# Patient Record
Sex: FEMALE | Race: OTHER | NOT HISPANIC OR LATINO | Employment: UNEMPLOYED | URBAN - METROPOLITAN AREA
[De-identification: names, ages, dates, MRNs, and addresses within clinical notes are randomized per-mention and may not be internally consistent; named-entity substitution may affect disease eponyms.]

---

## 2020-08-31 ENCOUNTER — OFFICE VISIT (OUTPATIENT)
Dept: FAMILY MEDICINE CLINIC | Facility: CLINIC | Age: 30
End: 2020-08-31

## 2020-08-31 ENCOUNTER — LAB (OUTPATIENT)
Dept: LAB | Facility: HOSPITAL | Age: 30
End: 2020-08-31

## 2020-08-31 VITALS
HEIGHT: 63 IN | HEART RATE: 61 BPM | WEIGHT: 82 LBS | OXYGEN SATURATION: 99 % | SYSTOLIC BLOOD PRESSURE: 90 MMHG | DIASTOLIC BLOOD PRESSURE: 48 MMHG | BODY MASS INDEX: 14.53 KG/M2

## 2020-08-31 DIAGNOSIS — R19.7 DIARRHEA, UNSPECIFIED TYPE: ICD-10-CM

## 2020-08-31 DIAGNOSIS — E44.0 MODERATE PROTEIN-CALORIE MALNUTRITION (HCC): ICD-10-CM

## 2020-08-31 DIAGNOSIS — F41.9 ANXIETY: ICD-10-CM

## 2020-08-31 DIAGNOSIS — R63.4 WEIGHT LOSS: ICD-10-CM

## 2020-08-31 LAB
BASOPHILS # BLD AUTO: 0.04 10*3/MM3 (ref 0–0.2)
BASOPHILS NFR BLD AUTO: 0.6 % (ref 0–1.5)
DEPRECATED RDW RBC AUTO: 42 FL (ref 37–54)
EOSINOPHIL # BLD AUTO: 0.17 10*3/MM3 (ref 0–0.4)
EOSINOPHIL NFR BLD AUTO: 2.5 % (ref 0.3–6.2)
ERYTHROCYTE [DISTWIDTH] IN BLOOD BY AUTOMATED COUNT: 12.1 % (ref 12.3–15.4)
HCT VFR BLD AUTO: 42.6 % (ref 34–46.6)
HGB BLD-MCNC: 13.8 G/DL (ref 12–15.9)
IMM GRANULOCYTES # BLD AUTO: 0.01 10*3/MM3 (ref 0–0.05)
IMM GRANULOCYTES NFR BLD AUTO: 0.1 % (ref 0–0.5)
LYMPHOCYTES # BLD AUTO: 2.1 10*3/MM3 (ref 0.7–3.1)
LYMPHOCYTES NFR BLD AUTO: 31.3 % (ref 19.6–45.3)
MCH RBC QN AUTO: 30.3 PG (ref 26.6–33)
MCHC RBC AUTO-ENTMCNC: 32.4 G/DL (ref 31.5–35.7)
MCV RBC AUTO: 93.6 FL (ref 79–97)
MONOCYTES # BLD AUTO: 0.75 10*3/MM3 (ref 0.1–0.9)
MONOCYTES NFR BLD AUTO: 11.2 % (ref 5–12)
NEUTROPHILS NFR BLD AUTO: 3.63 10*3/MM3 (ref 1.7–7)
NEUTROPHILS NFR BLD AUTO: 54.3 % (ref 42.7–76)
NRBC BLD AUTO-RTO: 0 /100 WBC (ref 0–0.2)
PLATELET # BLD AUTO: 231 10*3/MM3 (ref 140–450)
PMV BLD AUTO: 11.8 FL (ref 6–12)
RBC # BLD AUTO: 4.55 10*6/MM3 (ref 3.77–5.28)
WBC # BLD AUTO: 6.7 10*3/MM3 (ref 3.4–10.8)

## 2020-08-31 PROCEDURE — 85025 COMPLETE CBC W/AUTO DIFF WBC: CPT

## 2020-08-31 PROCEDURE — 84443 ASSAY THYROID STIM HORMONE: CPT

## 2020-08-31 PROCEDURE — 80053 COMPREHEN METABOLIC PANEL: CPT

## 2020-08-31 PROCEDURE — 99204 OFFICE O/P NEW MOD 45 MIN: CPT | Performed by: INTERNAL MEDICINE

## 2020-08-31 RX ORDER — ESCITALOPRAM OXALATE 10 MG/1
10 TABLET ORAL DAILY
Qty: 30 TABLET | Refills: 5 | Status: SHIPPED | OUTPATIENT
Start: 2020-08-31 | End: 2022-09-17

## 2020-08-31 NOTE — PROGRESS NOTES
Kimberlee Max  1990  [id number]  Patient Care Team:  Provider, No Known as PCP - General    Kimberlee Max is a 30 y.o. female here today to establish care.  This patient is accompanied by their  who contributes to the history of their care.    Chief Complaint:    Chief Complaint   Patient presents with   • Establish Care   • Anxiety         History of Present Illness:   This is a 30-year-old Mexican female who is in attendance with her .  He translates and adds history.  She has been continuously losing weight and having postprandial diarrhea and becoming withdrawn since the birth of their second son.  Child is nearly 2.  Soon after giving birth patient took her son back home to [facility].  She was there approximately 6 months.  Since being back her symptoms began.  She is described as withdrawn not making eye contact.  She has difficulty at times with obsession with over washing the baby after it bodies.  Additionally she has become obsessed with her personal hygiene after she soils her self.  She can have 2 loose bowel movements after eating.  Her baseline weight is unknown however indicates that her appetite is poor.  There have been no fevers or chills.  There is no abdominal pain.  There is no blood in her stool.  She apparently has been seen at the [facility] emergency room in [facility] several times (?  Obstetrics) and was told to find a primary care.  There is no nausea or vomiting.  They have a cousin who stays with him.  At times the  will be called in numerous times by his wife and he will have to leave work to come attend to her and the baby.  He is becoming increasingly stressed with having to leave work to care for both.    She was not depressed or anxious after her first birth.  There is marked anhedonia.  There is decrease in appetite.  She does not cry.  She does admit to frequent anxiety.      No past medical history on file.    No past surgical history on  "file.     No family history on file.    Social History     Socioeconomic History   • Marital status: Single     Spouse name: Not on file   • Number of children: Not on file   • Years of education: Not on file   • Highest education level: Not on file   Tobacco Use   • Smoking status: Never Smoker   • Smokeless tobacco: Never Used   Substance and Sexual Activity   • Alcohol use: Never     Frequency: Never   • Drug use: No       No Known Allergies    Review of Systems:    Review of Systems   Constitutional: Positive for appetite change, fatigue and unexpected weight loss.   Eyes: Negative for blurred vision and double vision.   Cardiovascular: Negative for chest pain, palpitations and leg swelling.   Gastrointestinal: Positive for diarrhea.   Endocrine: Negative for cold intolerance, heat intolerance, polydipsia and polyphagia.   Genitourinary: Negative for breast pain, dysuria and hematuria.   Musculoskeletal: Negative for arthralgias, back pain and myalgias.   Skin: Negative for dry skin and rash.   Allergic/Immunologic: Negative for environmental allergies.   Neurological: Negative for light-headedness, headache and confusion.   Psychiatric/Behavioral: Positive for decreased concentration, dysphoric mood and depressed mood. Negative for hallucinations, self-injury and suicidal ideas. The patient is nervous/anxious.        Vitals:    08/31/20 1446   BP: 90/48   Pulse: 61   SpO2: 99%   Weight: 37.2 kg (82 lb)   Height: 160 cm (63\")     Body mass index is 14.53 kg/m².      Current Outpatient Medications:   •  escitalopram (Lexapro) 10 MG tablet, Take 1 tablet by mouth Daily., Disp: 30 tablet, Rfl: 5    Physical Exam:    Physical Exam   Constitutional: She is oriented to person, place, and time. She appears well-developed and well-nourished. No distress.   Thin underweight, withdrawn   HENT:   Head: Normocephalic and atraumatic.   Right Ear: External ear normal.   Left Ear: External ear normal.   Mouth/Throat: " Oropharynx is clear and moist. No oropharyngeal exudate.   Eyes: Conjunctivae and EOM are normal. Right eye exhibits no discharge. Left eye exhibits no discharge. No scleral icterus.   Neck: Normal range of motion. Neck supple. No JVD present. No tracheal deviation present. No thyromegaly present.   Cardiovascular: Normal rate, regular rhythm, normal heart sounds and intact distal pulses.   PMI nondisplaced   Pulmonary/Chest: Effort normal and breath sounds normal. She has no wheezes. She has no rales.   No dullness to percussion     Abdominal: Soft. Bowel sounds are normal. There is no tenderness. There is no rebound and no guarding.   Musculoskeletal: Normal range of motion. She exhibits deformity. She exhibits no edema.        Lymphadenopathy:     She has no cervical adenopathy.   Neurological: She is alert and oriented to person, place, and time. No cranial nerve deficit. She exhibits normal muscle tone. Coordination normal.   Skin: Skin is warm and dry. Capillary refill takes less than 2 seconds. No rash noted. She is not diaphoretic. No pallor.   Psychiatric: Her speech is delayed. She is slowed and withdrawn. Thought content is not delusional. She exhibits a depressed mood. She expresses no suicidal plans and no homicidal plans.   Her affect is blunted.  She will not make eye contact.  She sits staring at the floor.  Marked psychomotor retardation is noted She is inattentive.   Nursing note and vitals reviewed.      Procedures    Results Review:    None    Assessment/Plan:      Problem List Items Addressed This Visit     None      Visit Diagnoses     Postpartum depression    -  Primary    Relevant Medications    escitalopram (Lexapro) 10 MG tablet    Other Relevant Orders    CBC & Differential    Comprehensive Metabolic Panel    TSH Rfx On Abnormal To Free T4    Ambulatory Referral to Gastroenterology    Anxiety        Relevant Orders    CBC & Differential    Comprehensive Metabolic Panel    TSH Rfx On  Abnormal To Free T4    Ambulatory Referral to Gastroenterology    Diarrhea, unspecified type        Weight loss          I am unsure what is going on however most concerned about postpartum depression versus major depression.  Interesting to note that her symptoms began after returning from her home [place].  I do believe she needs acute psychiatric intervention and have referred him to the [facility] for assessment.  I also recommend Lexapro 10 mg daily.  I would like to get her medical records however requested a complete metabolic panel CBC and TSH to initiate evaluation.  Finally given her diarrhea and progressive weight loss gastroenterologic referral for endoscopic evaluation is warranted.    Plan of care reviewed with patient at the conclusion of today's visit. Education was provided regarding diagnosis and management.  Patient verbalizes understanding of and agreement with management plan.    Return in about 4 weeks (around 9/28/2020).    José Miguel Ojeda MD    Please note that portions of this note may have been completed with a voice recognition program. Efforts were made to edit the dictations, but occasionally words are mistranscribed.

## 2020-09-01 LAB
ALBUMIN SERPL-MCNC: 4.6 G/DL (ref 3.5–5.2)
ALBUMIN/GLOB SERPL: 1.6 G/DL
ALP SERPL-CCNC: 55 U/L (ref 39–117)
ALT SERPL W P-5'-P-CCNC: 12 U/L (ref 1–33)
ANION GAP SERPL CALCULATED.3IONS-SCNC: 8.2 MMOL/L (ref 5–15)
AST SERPL-CCNC: 20 U/L (ref 1–32)
BILIRUB SERPL-MCNC: 0.5 MG/DL (ref 0–1.2)
BUN SERPL-MCNC: 15 MG/DL (ref 6–20)
BUN/CREAT SERPL: 15.6 (ref 7–25)
CALCIUM SPEC-SCNC: 9.9 MG/DL (ref 8.6–10.5)
CHLORIDE SERPL-SCNC: 104 MMOL/L (ref 98–107)
CO2 SERPL-SCNC: 28.8 MMOL/L (ref 22–29)
CREAT SERPL-MCNC: 0.96 MG/DL (ref 0.57–1)
GFR SERPL CREATININE-BSD FRML MDRD: 69 ML/MIN/1.73
GFR SERPL CREATININE-BSD FRML MDRD: 84 ML/MIN/1.73
GLOBULIN UR ELPH-MCNC: 2.9 GM/DL
GLUCOSE SERPL-MCNC: 59 MG/DL (ref 65–99)
POTASSIUM SERPL-SCNC: 4 MMOL/L (ref 3.5–5.2)
PROT SERPL-MCNC: 7.5 G/DL (ref 6–8.5)
SODIUM SERPL-SCNC: 141 MMOL/L (ref 136–145)
TSH SERPL DL<=0.05 MIU/L-ACNC: 1.4 UIU/ML (ref 0.27–4.2)

## 2020-09-22 ENCOUNTER — TELEPHONE (OUTPATIENT)
Dept: FAMILY MEDICINE CLINIC | Facility: CLINIC | Age: 30
End: 2020-09-22

## 2020-09-22 ENCOUNTER — HOSPITAL ENCOUNTER (OUTPATIENT)
Dept: NUTRITION | Facility: HOSPITAL | Age: 30
Setting detail: RECURRING SERIES
Discharge: HOME | End: 2020-09-22

## 2020-09-22 PROCEDURE — 97802 MEDICAL NUTRITION INDIV IN: CPT

## 2020-09-22 NOTE — TELEPHONE ENCOUNTER
PER LEN ROY  DIETITIAN ,WOULD LIKE TO  ASK A FEW QUESTIONS REGARDING PT. DURING VISIT TODAY PT SHARED SHE HAS NOT BEEN EATING AT ALL AND AFTER SHE EATS ANY FOOD SHE IMMEDIATELY GOES TO RESTROOM AND HAS BEEN HAVING DIARRHEA FOR DAYS.  LEN DID RECOMMEND A CLEAR LIQUID DIET, OR BRAT DIET BUT WOULD LIKE TO SPEAK WITH DR. PHAM ABOUT THE PT.      [phone]

## 2020-09-23 VITALS — HEIGHT: 63 IN | WEIGHT: 82.01 LBS | BODY MASS INDEX: 14.53 KG/M2

## 2020-09-23 RX ORDER — MEGESTROL ACETATE 125 MG/ML
625 SUSPENSION ORAL DAILY
Qty: 400 ML | Refills: 2 | Status: SHIPPED | OUTPATIENT
Start: 2020-09-23 | End: 2022-09-17

## 2020-09-23 NOTE — CONSULTS
"Adult Outpatient Nutrition  Assessment/PES    Patient Name:  Kimberlee Max  YOB: 1990  MRN: [id number]    Assessment Date:  09/22/2020    Comments:      This medical referred consult was provided via telehealth as patient was unable to attend an in-office appointment today due to the COVID-19 crisis. Consent for treatment was given verbally. Ernestina  services used today (ID#[id number]). Patient and her spouse present for the visit. RD spent a total of 30 minutes with patient today.      Patient seen today for unintended weight loss. Patient reported she has unintentionally lost ~15 lbs in the past 6 months. Patient shared that after any food she intakes, she \"immediately has a loose bowel movement.\" Patient reported she has recently only been eating one or two servings of plain white rice daily and still experiencing these loose bowel movements. The patient and her spouse stated they are looking for a solution to stop the weight loss and the loose bowel movements.    RD emphasized concern for the risk of dehydration and the seriousness of patient's unintended weight loss. RD shared with the patient and her spouse that RD would like to call the provider and collaborate on recommendations for the patient. RD encouraged patient to drink plenty of water and try a clear liquid diet until RD and MD can collaborate. RD mailed patient several pictures of clear liquid foods to try, including Ensure Clear and broth. RD also mailed patient materials for the BRAT diet (bananas, rice, applesauce, and toast) if patient is able to advance her diet in a couple days. RD explained clear liquid diet and brat diet to the patient. AUGUSTINA shared that she would call the patient after collaborating with referring provider.     09/23/20 -  RD called referring provider, Dr. José Miguel Ojeda, and informed him of patient's situation as well as asked to collaborate. MD stated that he is fine with the patient being placed on a clear " "liquid diet and advancing to a BRAT diet when able. RD noticed patient is scheduled to see GI on 10/09/2020. RD will wait to schedule follow up visit with patient until after GI consult. RD will follow GI recommendations.        Goals:  1) Follow clear liquid diet for 2 days and record bowel movements.   2) Advance to BRAT diet if able to tolerate clear liquid diet after 2 days.       Total of 30 minutes spent with patient on nutrition counseling. Education based on Academy of Dietetics and Nutrition guidelines. Patient was provided with RD's contact information. RD will call patient after collaborating with referring provider. Thank you for this referral.    General Info     Row Name 09/23/20 0842       Today's Session    Person(s) attending today's session  Patient;Spouse     Services Used Today?  Yes       General Information    How Well Do You Speak English?  not at all well    Preferred Language  Ernestina    Are you able to read and write English?  No    Lives With  spouse;child(hunter), dependent          Anthropometrics     Row Name 09/23/20 0846 09/23/20 0843       Anthropometrics    Height  160 cm (62.99\")  160 cm (62.99\")    Weight  --  37.2 kg (82 lb 0.2 oz)       Ideal Body Weight (IBW)    Ideal Body Weight (IBW) (kg)  52.7  52.7    % Ideal Body Weight  --  70.59       Usual Body Weight (UBW)    Usual Body Weight  --  44 kg (97 lb)    % Usual Body Weight  --  84.55    Weight Loss  --  unintentional    Weight Loss Time Frame  --  6 months       Body Mass Index (BMI)    BMI (kg/m2)  --  14.56        Nutritional Info/Activity     Row Name 09/23/20 0845       Nutritional Information    Have you had weight changes?  Yes    Describe weight changes  Patient reported she has lost ~ 15 lbs unintentionally in the past 6 months    What is the biggest challenge you have with your diet?  -- Patient reports she has an immediate loose bowel movement after eating anything          Estimated/Assessed Needs     Row " "Name 09/23/20 0846 09/23/20 0843       Calculation Measurements    Weight Used For Calculations  44 kg (97 lb)  --    Height  160 cm (62.99\")  160 cm (62.99\")       Estimated/Assessed Needs    Additional Documentation  [place]-St. Jeor Equation (Group)  --       [place]-St. Jeor Equation    RMR ([place]-St. Jeor Equation)  [id number]  --    [place]-St. Jeor Activity Factors  1.2  --    Activity Factors ([place]-St. Jeor)  [id number]  --                Problem/Interventions:  Problem 1     Row Name 09/23/20 0847          Nutrition Diagnoses Problem 1    Problem 1  Unintended Weight Loss     Etiology (related to)  -- reported loose bowel movements after oral intake     Signs/Symptoms (evidenced by)  -- Unintended 15 lb weight loss in 6 months                 Intervention Goal     Row Name 09/23/20 0849          Intervention Goal    General  Provide information regarding MNT for treatment/condition     PO  Establish PO     Weight  Appropriate weight gain           Nutrition Prescription     Row Name 09/23/20 0850          Nutrition Prescription PO    PO Prescription  -- Clear Liquid Diet and BRAT Diet         Education/Evaluation     Row Name 09/23/20 0851          Education    Education  Provided education regarding     Provided education regarding  Avoidance/improvement of symptoms for diarrhea        Monitor/Evaluation    Monitor  Weight;PO intake     Education Follow-up  Reinforce PRN           Electronically signed by:  Keturah Tyson RD  09/23/20 08:55 EDT  "

## 2020-09-24 NOTE — TELEPHONE ENCOUNTER
Could you notify patient and or  that an appetits stimulant has been sent in to take 5 Ml daily. Also ask if she did or did not go to the [facility] for psychiatric assessment

## 2020-09-24 NOTE — TELEPHONE ENCOUNTER
is not on [facility]OR[facility] so unable to speak to anyone except the patient.  Pt gave verbal ok to speak with patient.     Spoke with patient - they went to [facility].  They did not keep her and who they referred them but they cannot see them for 4-5 month.

## 2020-09-25 NOTE — TELEPHONE ENCOUNTER
Patients  was notified to take her to [facility]. He verbalized understanding and had no further questions right now.

## 2020-09-25 NOTE — TELEPHONE ENCOUNTER
I recommend he try taking her to [facility] emergency room.  She is extremely malnourished, she needs psychiatric attention as well.  I believe the majority of her weight loss is psychogenic.  We could admit her to [facility] under observation however no psychologic or psychiatric support is present.  This is an extremely tough situation with a withdrawal and language barrier as well

## 2020-09-25 NOTE — TELEPHONE ENCOUNTER
I recommend he try taking her to [facility] emergency room.  She is extremely malnourished, she needs psychiatric attention as well.  I believe the majority of her weight loss is psychogenic.  We could admit her to [facility] under observation however no psychologic or psychiatric support is present.  This is an extremely tough situation with a cultural and language barrier as well

## 2020-09-25 NOTE — ADDENDUM NOTE
Encounter addended by: Keturah Tyson RD on: 9/25/2020 10:11 AM   Actions taken: Clinical Note Signed

## 2020-10-02 ENCOUNTER — PRIOR AUTHORIZATION (OUTPATIENT)
Dept: FAMILY MEDICINE CLINIC | Facility: CLINIC | Age: 30
End: 2020-10-02

## 2020-10-02 NOTE — TELEPHONE ENCOUNTER
Key: [id number]  Megestrol Acetate 625MG/5ML suspension  Status: Sent to Plan  Created: September 23rd, 2020 [phone]  Sent: October 2nd, 2020

## 2020-10-06 NOTE — TELEPHONE ENCOUNTER
PA DENIED    States must have one diagnosis:  HIV and AIDS, cancer, wasting associated with an advanced medical condition. Must have a documented weight loss of greater than or equal 10 percent of body weight.

## 2020-10-15 ENCOUNTER — TELEPHONE (OUTPATIENT)
Dept: NUTRITION | Facility: HOSPITAL | Age: 30
End: 2020-10-15

## 2020-10-15 NOTE — PROGRESS NOTES
Adult Outpatient Nutrition  Assessment/PES    Patient Name:  Kimberlee Max  YOB: 1990  MRN: [id number]    Assessment Date:  10/15/2020    Comments:      Called patient to follow up on nutrition recommendations from her initial visit on 09/22/20. From initial visit, patient prefers her  to translate. Patient gave RD verbal permission for her  to translate. Patient reported she is still having loose stools immediately after eating.  reported that they have not opened the packet of information RD mailed to them. Packet contained pictures of foods and drinks that the patient was recommended to eat. RD encouraged patient to open packet to make sure they understood the dietary recommendations.     RD reinforced recommendations from initial visit: clear liquid diet for 2-4 days then transition to BRAT diet. Pictures were provided of clear liquids including broth, Ensure Clear, Cranberry juice, water, popsicles, and jello. RD emphasized the importance of hydration since the risk of dehydration with diarrhea is so high. Pictures were also provided of BRAT diet: bananas, rice, apple sauce, and toast. Patient and  stated they understood recommendations and would start making changes.    RD encouraged patient to see GI (appointment originally scheduled for 10/09/20).  reported patient saw Psychiatry and that it went well. RD encouraged patient and  to follow up with psychiatry and referring PCP. RD recommended patient schedule nutrition follow up to see how clear liquid diet and BRAT diet are going. Follow up scheduled for 10/26/20 at 3:00 PM. Total of 30 minutes was spent with patient on phone. Thank you for this referral.             Electronically signed by:  Keturah Tyson RD  10/15/20 10:59 EDT

## 2020-10-26 ENCOUNTER — HOSPITAL ENCOUNTER (OUTPATIENT)
Dept: NUTRITION | Facility: HOSPITAL | Age: 30
Setting detail: RECURRING SERIES
Discharge: HOME | End: 2020-10-26

## 2020-10-26 NOTE — PROGRESS NOTES
It appears this patient , who does not speak english, may have been lost to follow up.  She apparently continues to lose weight and have diarrhea. She was referred to GI, but no showed. Has been doing video visits with dietician. She apparently was assessed at the [facility] after my first and only visit with them. She has a baby at home, her  works. I am concerned about their well being.     She needs to see GI  I would suggest an evaluation in the emergency room, here or [facility] to document weight, get labs.     I am contemplating a  evaluation as well.    I have placed orders for follow-up labs and will have staff contact

## 2020-10-26 NOTE — CONSULTS
Adult Outpatient Nutrition  Assessment/PES    Patient Name:  Kimberlee aMx  YOB: 1990  MRN: [id number]    Assessment Date:  10/26/2020    Comments:      This medical referred consult was provided via telehealth as patient was unable to attend an in-office appointment today due to the COVID-19 crisis. Consent for treatment was given verbally. RD spent a total of 30 minutes with patient today.      Patient is a 30 year old female seen today for a nutrition follow up visit. Patient does not speak English. Her preferred language is Ernestina. Patient requested her  translate rather than using an . Patient's  reported patient has not tried the clear liquid diet or BRAT Diet. Spouse stated patient does not like juice, broth, or Ensure. Spouse shared she is still having loose stools immediately after meals. He is not sure of how much weight patient has lost, but he suspects she is continuing to lose weight.     24-hour Dietary Recall:  1/2 cup rice  1/2 cup mixed vegetables  1/2 cup  Dull    RD concerned for patient's unintended weight loss and risk for dehydration. Patient is not meeting overall energy or protein needs. RD advised patient to drink water throughout the day. RD recommended patient and spouse call gastroenterology to reschedule their appointment (Originally scheduled for 10/09/20. Recorded as a NS). Spouse stated he will plan to call gastroenterology tomorrow after work for an appointment. RD encouraged patient to limit her vegetable intake and increase her intake of fluids, rice, apple sauce, bananas and bread. Patient and  stated they still have the pictures that RD mailed of the foods recommended she eat. Patient and spouse stated they have no further nutrition questions at this time. RD encouraged patient to call with any needs .     Goal Completion:  1) 1) Follow clear liquid diet for 2 days and record bowel movements - 0%  2) Advance to BRAT diet if able  to tolerate clear liquid diet after 2 days - 0%    Total of 30 minutes spent with patient on nutrition counseling. Education based on Academy of Dietetics and Nutrition guidelines. Patient was provided with RD's contact information. Thank you for this referral.      General Info     Row Name 10/26/20 1508       Today's Session    Person(s) attending today's session  Patient;Spouse     Services Used Today?  -- Patient gave verbal permission for her  to translate. She prefers her  over an .       General Information    Preferred Language  Ernestina    Lives With  child(hunter), dependent;spouse              Problem/Interventions:    Problem 2     Row Name 10/26/20 1503          Nutrition Diagnoses Problem 2    Problem 2  Inadequate Nutrient Intake     Etiology (related to)  -- diarrhea     Signs/Symptoms (evidenced by)  -- patient reporting loose stools immediately after each meal         Problem 3     Row Name 10/26/20 150          Nutrition Diagnoses Problem 3    Problem 3  Limited Adherence to Diet Modifications     Etiology (related to)  -- non-compliace with following nutrition recommendations and scheduling visit with gastroenterology     Signs/Symptoms (evidenced by)  -- spouse reporting patient has not tried clear liquid diet or BRAT diet. Reported they have not scheduled visit with gastroenterology.               Electronically signed by:  Keturah Tyson RD  10/26/20 15:11 EDT

## 2020-10-27 ENCOUNTER — TELEPHONE (OUTPATIENT)
Dept: FAMILY MEDICINE CLINIC | Facility: CLINIC | Age: 30
End: 2020-10-27

## 2020-10-27 NOTE — TELEPHONE ENCOUNTER
José Miguel Ojeda MD  P Jared  [facility]  Clinical Pool             Could you reach out to this family.  speaks for her. It appears this lady, who does not speak english, may have been lost to follow up.  She apparently continues to lose weight and have diarrhea. She was referred to GI, but no showed. Has been doing video visits with dietician. She apparently was assessed at the [facility] after my first and only visit with them. She has a baby at home, her  works. I am concerned about their well being.     She needs to see GI   I would suggest an evaluation in the emergency room, here, or [facility] to document weight, get labs, possible admission and enteral feeds     I am contemplating a  evaluation as well. Thanks

## 2020-10-27 NOTE — TELEPHONE ENCOUNTER
Attempted to contact patient, by phone listed in chart. Patient answered and hung up the phone after stating where I was calling from.

## 2020-10-29 NOTE — TELEPHONE ENCOUNTER
Contacted patient  and relayed Dr. Ojeda's message. There has been some miscommunication between the GI clinic and the patient, she has been waiting on a call from their office.     I provided the  with Dr. Le's contact info. I advised him to schedule an appt and if he has any difficulties to keep us updated. He verbalized understanding and agreed

## 2021-09-04 PROCEDURE — U0004 COV-19 TEST NON-CDC HGH THRU: HCPCS | Performed by: NURSE PRACTITIONER

## 2022-09-17 ENCOUNTER — HOSPITAL ENCOUNTER (EMERGENCY)
Facility: HOSPITAL | Age: 32
Discharge: HOME | End: 2022-09-17
Attending: EMERGENCY MEDICINE | Admitting: EMERGENCY MEDICINE

## 2022-09-17 ENCOUNTER — APPOINTMENT (OUTPATIENT)
Dept: CT IMAGING | Facility: HOSPITAL | Age: 32
End: 2022-09-17

## 2022-09-17 VITALS
OXYGEN SATURATION: 100 % | TEMPERATURE: 97.8 F | SYSTOLIC BLOOD PRESSURE: 120 MMHG | WEIGHT: 90.17 LBS | HEART RATE: 59 BPM | HEIGHT: 63 IN | BODY MASS INDEX: 15.98 KG/M2 | DIASTOLIC BLOOD PRESSURE: 73 MMHG | RESPIRATION RATE: 16 BRPM

## 2022-09-17 DIAGNOSIS — N20.0 KIDNEY STONE ON LEFT SIDE: ICD-10-CM

## 2022-09-17 DIAGNOSIS — N23 URETERAL COLIC: Primary | ICD-10-CM

## 2022-09-17 LAB
ALBUMIN SERPL-MCNC: 4.4 G/DL (ref 3.5–5.2)
ALBUMIN/GLOB SERPL: 1.4 G/DL
ALP SERPL-CCNC: 60 U/L (ref 39–117)
ALT SERPL W P-5'-P-CCNC: 13 U/L (ref 1–33)
ANION GAP SERPL CALCULATED.3IONS-SCNC: 11 MMOL/L (ref 5–15)
AST SERPL-CCNC: 18 U/L (ref 1–32)
B-HCG UR QL: NEGATIVE
BACTERIA UR QL AUTO: ABNORMAL /HPF
BASOPHILS # BLD AUTO: 0.02 10*3/MM3 (ref 0–0.2)
BASOPHILS NFR BLD AUTO: 0.2 % (ref 0–1.5)
BILIRUB SERPL-MCNC: 0.4 MG/DL (ref 0–1.2)
BILIRUB UR QL STRIP: NEGATIVE
BUN SERPL-MCNC: 11 MG/DL (ref 6–20)
BUN/CREAT SERPL: 19.6 (ref 7–25)
CALCIUM SPEC-SCNC: 9.1 MG/DL (ref 8.6–10.5)
CHLORIDE SERPL-SCNC: 105 MMOL/L (ref 98–107)
CLARITY UR: ABNORMAL
CO2 SERPL-SCNC: 24 MMOL/L (ref 22–29)
COD CRY URNS QL: ABNORMAL /HPF
COLOR UR: YELLOW
CREAT SERPL-MCNC: 0.56 MG/DL (ref 0.57–1)
DEPRECATED RDW RBC AUTO: 41.2 FL (ref 37–54)
EGFRCR SERPLBLD CKD-EPI 2021: 126.1 ML/MIN/1.73
EOSINOPHIL # BLD AUTO: 0.01 10*3/MM3 (ref 0–0.4)
EOSINOPHIL NFR BLD AUTO: 0.1 % (ref 0.3–6.2)
ERYTHROCYTE [DISTWIDTH] IN BLOOD BY AUTOMATED COUNT: 12.5 % (ref 12.3–15.4)
EXPIRATION DATE: NORMAL
GLOBULIN UR ELPH-MCNC: 3.2 GM/DL
GLUCOSE SERPL-MCNC: 105 MG/DL (ref 65–99)
GLUCOSE UR STRIP-MCNC: NEGATIVE MG/DL
HCT VFR BLD AUTO: 39.5 % (ref 34–46.6)
HGB BLD-MCNC: 13.3 G/DL (ref 12–15.9)
HGB UR QL STRIP.AUTO: ABNORMAL
HOLD SPECIMEN: NORMAL
HYALINE CASTS UR QL AUTO: ABNORMAL /LPF
IMM GRANULOCYTES # BLD AUTO: 0.03 10*3/MM3 (ref 0–0.05)
IMM GRANULOCYTES NFR BLD AUTO: 0.3 % (ref 0–0.5)
INTERNAL NEGATIVE CONTROL: NEGATIVE
INTERNAL POSITIVE CONTROL: POSITIVE
KETONES UR QL STRIP: ABNORMAL
LEUKOCYTE ESTERASE UR QL STRIP.AUTO: ABNORMAL
LIPASE SERPL-CCNC: 20 U/L (ref 13–60)
LYMPHOCYTES # BLD AUTO: 1.25 10*3/MM3 (ref 0.7–3.1)
LYMPHOCYTES NFR BLD AUTO: 11.6 % (ref 19.6–45.3)
Lab: NORMAL
MCH RBC QN AUTO: 30.6 PG (ref 26.6–33)
MCHC RBC AUTO-ENTMCNC: 33.7 G/DL (ref 31.5–35.7)
MCV RBC AUTO: 91 FL (ref 79–97)
MONOCYTES # BLD AUTO: 0.58 10*3/MM3 (ref 0.1–0.9)
MONOCYTES NFR BLD AUTO: 5.4 % (ref 5–12)
MUCOUS THREADS URNS QL MICRO: ABNORMAL /HPF
NEUTROPHILS NFR BLD AUTO: 8.89 10*3/MM3 (ref 1.7–7)
NEUTROPHILS NFR BLD AUTO: 82.4 % (ref 42.7–76)
NITRITE UR QL STRIP: NEGATIVE
NRBC BLD AUTO-RTO: 0 /100 WBC (ref 0–0.2)
PH UR STRIP.AUTO: 6 [PH] (ref 5–8)
PLATELET # BLD AUTO: 198 10*3/MM3 (ref 140–450)
PMV BLD AUTO: 12.6 FL (ref 6–12)
POTASSIUM SERPL-SCNC: 4.1 MMOL/L (ref 3.5–5.2)
PROT SERPL-MCNC: 7.6 G/DL (ref 6–8.5)
PROT UR QL STRIP: ABNORMAL
RBC # BLD AUTO: 4.34 10*6/MM3 (ref 3.77–5.28)
RBC # UR STRIP: ABNORMAL /HPF
REF LAB TEST METHOD: ABNORMAL
SODIUM SERPL-SCNC: 140 MMOL/L (ref 136–145)
SP GR UR STRIP: 1.02 (ref 1–1.03)
SQUAMOUS #/AREA URNS HPF: ABNORMAL /HPF
UROBILINOGEN UR QL STRIP: ABNORMAL
WBC # UR STRIP: ABNORMAL /HPF
WBC NRBC COR # BLD: 10.78 10*3/MM3 (ref 3.4–10.8)
WHOLE BLOOD HOLD COAG: NORMAL
WHOLE BLOOD HOLD SPECIMEN: NORMAL

## 2022-09-17 PROCEDURE — 83690 ASSAY OF LIPASE: CPT | Performed by: EMERGENCY MEDICINE

## 2022-09-17 PROCEDURE — 74177 CT ABD & PELVIS W/CONTRAST: CPT

## 2022-09-17 PROCEDURE — 99283 EMERGENCY DEPT VISIT LOW MDM: CPT

## 2022-09-17 PROCEDURE — 25010000002 CEFTRIAXONE PER 250 MG: Performed by: NURSE PRACTITIONER

## 2022-09-17 PROCEDURE — 25010000002 KETOROLAC TROMETHAMINE PER 15 MG: Performed by: NURSE PRACTITIONER

## 2022-09-17 PROCEDURE — 81025 URINE PREGNANCY TEST: CPT | Performed by: EMERGENCY MEDICINE

## 2022-09-17 PROCEDURE — 80053 COMPREHEN METABOLIC PANEL: CPT | Performed by: EMERGENCY MEDICINE

## 2022-09-17 PROCEDURE — 81001 URINALYSIS AUTO W/SCOPE: CPT | Performed by: EMERGENCY MEDICINE

## 2022-09-17 PROCEDURE — 96365 THER/PROPH/DIAG IV INF INIT: CPT

## 2022-09-17 PROCEDURE — 85025 COMPLETE CBC W/AUTO DIFF WBC: CPT | Performed by: EMERGENCY MEDICINE

## 2022-09-17 PROCEDURE — 25010000002 IOPAMIDOL 61 % SOLUTION: Performed by: EMERGENCY MEDICINE

## 2022-09-17 PROCEDURE — 96375 TX/PRO/DX INJ NEW DRUG ADDON: CPT

## 2022-09-17 RX ORDER — CEFDINIR 300 MG/1
300 CAPSULE ORAL 2 TIMES DAILY
Qty: 14 CAPSULE | Refills: 0 | Status: SHIPPED | OUTPATIENT
Start: 2022-09-17

## 2022-09-17 RX ORDER — ONDANSETRON 4 MG/1
4 TABLET, ORALLY DISINTEGRATING ORAL EVERY 6 HOURS PRN
Qty: 20 TABLET | Refills: 0 | Status: SHIPPED | OUTPATIENT
Start: 2022-09-17

## 2022-09-17 RX ORDER — KETOROLAC TROMETHAMINE 15 MG/ML
15 INJECTION, SOLUTION INTRAMUSCULAR; INTRAVENOUS ONCE
Status: COMPLETED | OUTPATIENT
Start: 2022-09-17 | End: 2022-09-17

## 2022-09-17 RX ORDER — SODIUM CHLORIDE 9 MG/ML
10 INJECTION INTRAVENOUS AS NEEDED
Status: DISCONTINUED | OUTPATIENT
Start: 2022-09-17 | End: 2022-09-17 | Stop reason: HOSPADM

## 2022-09-17 RX ORDER — HYDROCODONE BITARTRATE AND ACETAMINOPHEN 7.5; 325 MG/1; MG/1
1 TABLET ORAL EVERY 6 HOURS PRN
Qty: 12 TABLET | Refills: 0 | Status: SHIPPED | OUTPATIENT
Start: 2022-09-17

## 2022-09-17 RX ADMIN — SODIUM CHLORIDE 1 G: 900 INJECTION INTRAVENOUS at 14:19

## 2022-09-17 RX ADMIN — IOPAMIDOL 99 ML: 612 INJECTION, SOLUTION INTRAVENOUS at 13:14

## 2022-09-17 RX ADMIN — KETOROLAC TROMETHAMINE 15 MG: 15 INJECTION, SOLUTION INTRAMUSCULAR; INTRAVENOUS at 14:24

## 2022-09-17 RX ADMIN — SODIUM CHLORIDE 1000 ML: 9 INJECTION, SOLUTION INTRAVENOUS at 12:27

## 2022-09-17 NOTE — DISCHARGE INSTRUCTIONS
Home to rest.  Maintain your very best hydration and nutrition.  Drink ample water.  Use the prescription pain medication for that pain that breaks through ibuprofen 600 mg every 8 hours.  Do not take any of the medications on empty stomach.  Start the oral antibiotic tomorrow.  Return to the emergency department as needed for worsening symptoms or concerns.  Call the office of urologist, Dr. Mann on Monday for close follow-up.  Thank you

## 2022-09-17 NOTE — ED PROVIDER NOTES
EMERGENCY DEPARTMENT ENCOUNTER    Pt Name: Kimberlee Max  MRN: [id number]  Pt :   1990  Room Number:    Date of encounter:  2022  PCP: José Miguel Ojeda MD  ED Provider: NISSA Flower    Historian: patient and  with teleinterpreter      HPI:  Chief Complaint: abdominal pain        Context: Kimberlee Max is a 32 y.o. female who presents to the ED c/o abdominal pain which began this morning.  Patient reports some nausea without vomiting or diarrhea.  She was advised by urgent treatment center who evaluated her briefly this morning to seek emergency care.  She personally denies any history of GI disorder.  She takes no medications on a regular basis.  When her  arrived, he elaborated that the patient has had intermittent complaints of abdominal pain for many months that he attributes to homesickness or anxiety.  He also volunteers that the patient has obsessive cleaning habits after toileting.  I reviewed the records from her visit with colorectal surgery in March.  She was found to have no anal fistula at that time.  She states she has a occasional white discharge with no odor.  She has had no dysuria, frequency or urgency.  She has noticed no gross hematuria.  She notices no variables that induce or relieve her discomfort.  She denies any back pain.    Review of system is negative for fever or chills.  Negative for chest pain or cough or shortness of breath.  Positive for nausea without vomiting or diarrhea or constipation as aforementioned.  No profound weakness, dizziness or syncope.  No neurosensory complaints or focal weakness      PAST MEDICAL HISTORY  Past Medical History:   Diagnosis Date   • Kidney stone    • Post partum depression          PAST SURGICAL HISTORY  History reviewed. No pertinent surgical history.      FAMILY HISTORY  History reviewed. No pertinent family history.      SOCIAL HISTORY  Social History     Socioeconomic History   • Marital status:     Tobacco Use   • Smoking status: Never Smoker   • Smokeless tobacco: Never Used   Vaping Use   • Vaping Use: Never used   Substance and Sexual Activity   • Alcohol use: Never   • Drug use: No   • Sexual activity: Yes     Partners: Male         ALLERGIES  Patient has no known allergies.        REVIEW OF SYSTEMS  Review of Systems     All systems reviewed and negative except for those discussed in HPI.       PHYSICAL EXAM    I have reviewed the triage vital signs and nursing notes.    ED Triage Vitals [09/17/22 1052]   Temp Heart Rate Resp BP SpO2   97.8 °F (36.6 °C) 71 18 120/73 100 %      Temp src Heart Rate Source Patient Position BP Location FiO2 (%)   Oral Monitor Sitting Left arm --       Physical Exam  GENERAL:   Appears to have a flat affect.  Her vital signs are normal.  HENT: Nares patent.  EYES: No scleral icterus.  CV: Regular rhythm, regular rate.  No tachycardia.  No peripheral edema  RESPIRATORY: Normal effort.  No audible wheezes, rales or rhonchi.  ABDOMEN: Soft, patient localizes her presenting discomfort to the left upper quadrant greater than other quadrants.  No CVA tenderness  MUSCULOSKELETAL: No deformities.   NEURO: Alert, moves all extremities, follows commands.  SKIN: Warm, dry, no rash visualized.  Psychiatric: She has a flat affect      LAB RESULTS  Recent Results (from the past 24 hour(s))   POC Urinalysis Dipstick, Multipro (Automated Dipstick)    Collection Time: 09/17/22 10:24 AM    Specimen: Urine   Result Value Ref Range    Color Dark Yellow Yellow, Straw, Dark Yellow, Magali    Clarity, UA Cloudy (A) Clear    Glucose, UA Negative Negative mg/dL    Bilirubin Negative Negative    Ketones, UA Negative Negative    Specific Gravity  1.030 1.005 - 1.030    Blood, UA 3+ (A) Negative    pH, Urine 6.0 5.0 - 8.0    Protein, POC 1+ (A) Negative mg/dL    Urobilinogen, UA Normal Normal, 0.2 E.U./dL    Nitrite, UA Negative Negative    Leukocytes Negative Negative   Comprehensive Metabolic Panel     Collection Time: 09/17/22 11:18 AM    Specimen: Blood   Result Value Ref Range    Glucose 105 (H) 65 - 99 mg/dL    BUN 11 6 - 20 mg/dL    Creatinine 0.56 (L) 0.57 - 1.00 mg/dL    Sodium 140 136 - 145 mmol/L    Potassium 4.1 3.5 - 5.2 mmol/L    Chloride 105 98 - 107 mmol/L    CO2 24.0 22.0 - 29.0 mmol/L    Calcium 9.1 8.6 - 10.5 mg/dL    Total Protein 7.6 6.0 - 8.5 g/dL    Albumin 4.40 3.50 - 5.20 g/dL    ALT (SGPT) 13 1 - 33 U/L    AST (SGOT) 18 1 - 32 U/L    Alkaline Phosphatase 60 39 - 117 U/L    Total Bilirubin 0.4 0.0 - 1.2 mg/dL    Globulin 3.2 gm/dL    A/G Ratio 1.4 g/dL    BUN/Creatinine Ratio 19.6 7.0 - 25.0    Anion Gap 11.0 5.0 - 15.0 mmol/L    eGFR 126.1 >60.0 mL/min/1.73   Lipase    Collection Time: 09/17/22 11:18 AM    Specimen: Blood   Result Value Ref Range    Lipase 20 13 - 60 U/L   Green Top (Gel)    Collection Time: 09/17/22 11:18 AM   Result Value Ref Range    Extra Tube Hold for add-ons.    Lavender Top    Collection Time: 09/17/22 11:18 AM   Result Value Ref Range    Extra Tube     Gold Top - SST    Collection Time: 09/17/22 11:18 AM   Result Value Ref Range    Extra Tube Hold for add-ons.    Light Blue Top    Collection Time: 09/17/22 11:18 AM   Result Value Ref Range    Extra Tube Hold for add-ons.    CBC Auto Differential    Collection Time: 09/17/22 11:18 AM    Specimen: Blood   Result Value Ref Range    WBC 10.78 3.40 - 10.80 10*3/mm3    RBC 4.34 3.77 - 5.28 10*6/mm3    Hemoglobin 13.3 12.0 - 15.9 g/dL    Hematocrit 39.5 34.0 - 46.6 %    MCV 91.0 79.0 - 97.0 fL    MCH 30.6 26.6 - 33.0 pg    MCHC 33.7 31.5 - 35.7 g/dL    RDW 12.5 12.3 - 15.4 %    RDW-SD 41.2 37.0 - 54.0 fl    MPV 12.6 (H) 6.0 - 12.0 fL    Platelets 198 140 - 450 10*3/mm3    Neutrophil % 82.4 (H) 42.7 - 76.0 %    Lymphocyte % 11.6 (L) 19.6 - 45.3 %    Monocyte % 5.4 5.0 - 12.0 %    Eosinophil % 0.1 (L) 0.3 - 6.2 %    Basophil % 0.2 0.0 - 1.5 %    Immature Grans % 0.3 0.0 - 0.5 %    Neutrophils, Absolute 8.89 (H) 1.70 - 7.00  10*3/mm3    Lymphocytes, Absolute 1.25 0.70 - 3.10 10*3/mm3    Monocytes, Absolute 0.58 0.10 - 0.90 10*3/mm3    Eosinophils, Absolute 0.01 0.00 - 0.40 10*3/mm3    Basophils, Absolute 0.02 0.00 - 0.20 10*3/mm3    Immature Grans, Absolute 0.03 0.00 - 0.05 10*3/mm3    nRBC 0.0 0.0 - 0.2 /100 WBC   Urinalysis With Microscopic If Indicated (No Culture) - Urine, Clean Catch    Collection Time: 09/17/22 12:19 PM    Specimen: Urine, Clean Catch   Result Value Ref Range    Color, UA Yellow Yellow, Straw    Appearance, UA Cloudy (A) Clear    pH, UA 6.0 5.0 - 8.0    Specific Gravity, UA 1.022 1.001 - 1.030    Glucose, UA Negative Negative    Ketones, UA Trace (A) Negative    Bilirubin, UA Negative Negative    Blood, UA Large (3+) (A) Negative    Protein, UA 30 mg/dL (1+) (A) Negative    Leuk Esterase, UA Small (1+) (A) Negative    Nitrite, UA Negative Negative    Urobilinogen, UA 0.2 E.U./dL 0.2 - 1.0 E.U./dL   Urinalysis, Microscopic Only - Urine, Clean Catch    Collection Time: 09/17/22 12:19 PM    Specimen: Urine, Clean Catch   Result Value Ref Range    RBC, UA Too Numerous to Count (A) None Seen, 0-2 /HPF    WBC, UA 21-30 (A) None Seen, 0-2 /HPF    Bacteria, UA 2+ (A) None Seen, Trace /HPF    Squamous Epithelial Cells, UA 0-2 None Seen, 0-2 /HPF    Hyaline Casts, UA 0-6 0 - 6 /LPF    Calcium Oxalate Crystals, UA Moderate/2+ None Seen /HPF    Mucus, UA Small/1+ (A) None Seen, Trace /HPF    Methodology Manual Light Microscopy    POC Urine Pregnancy    Collection Time: 09/17/22 12:24 PM    Specimen: Urine   Result Value Ref Range    HCG, Urine, QL Negative Negative    Lot Number [id number]     Internal Positive Control Positive Positive, Passed    Internal Negative Control Negative Negative, Passed    Expiration Date 10,676,020        If labs were ordered, I independently reviewed the results.        RADIOLOGY  CT Abdomen Pelvis With Contrast    Result Date: 9/17/2022  DATE OF EXAM: 9/17/2022 1:04 PM  PROCEDURE: CT ABDOMEN  PELVIS W CONTRAST-  INDICATIONS: diffuse abdominal pain; no flank pain; worse at LUQ; hx of hematuria, possibly  COMPARISON: No comparisons available.  TECHNIQUE: Routine transaxial slices were obtained through the abdomen and pelvis after the intravenous administration of 99 mL of Isovue 300. Reconstructed coronal and sagittal images were also obtained. Automated exposure control and iterative construction methods were used.  The radiation dose reduction device was turned on for each scan per the ALARA (As Low as Reasonably Achievable) protocol.  FINDINGS: The included lung bases appear clear. There is diffuse fatty liver change. Liver otherwise appears normal. Spleen is not enlarged. No significant abnormalities are noted of the gallbladder, pancreas, or adrenal glands. There is a simple appearing, nonenhancing 2.2 small right renal cyst. Axial image 61 series 2, coronal image 31 series 900 shadow an approximately 5 mm calculus at or just below the level of the left ureterovesical junction. There appears to be only mild resulting left hydronephrosis, and only a very slight asymmetry in enhancement of the kidneys, practically normal, but subtly decreased on the left compared to the right. No focal cortical hypoenhancement is seen to suggest a pyelonephritis. No other evidence of urolithiasis is identified. No upper abdominal adenopathy, ascites or acute inflammatory change is seen. Bowel loops are nondistended.  Regarding the lower abdomen and pelvis, the terminal ileum, cecum and appendix lie in the right mid abdomen and appear within normal limits.  Uterus is normal in size and appearance. Ovaries do not appear to be enlarged. There is a 1.5 x 1.1 x 1.7 cm left ovarian cyst. No significant intrapelvic free fluid is seen. Bladder is nondistended and normal in appearance.  Delayed venous phase images show no evidence of obstructive uropathy, with contrast passing readily beyond the proximal ureteral calculus. Bony  structures appear to be intact.       1. 5 mm calculus at or just inferior of the left ureterovesical junction with only mild hydronephrosis, and no significant obstructive uropathy. 2. Incidentally noted 17 mm left ovarian cyst, and simple appearing 2.2 cm right renal cyst.  This report was finalized on 9/17/2022 1:42 PM by Dr. Abbe Kauffman MD.           PROCEDURES    Procedures    No orders to display       MEDICATIONS GIVEN IN ER    Medications   Sodium Chloride (PF) 0.9 % 10 mL (has no administration in time range)   sodium chloride 0.9 % bolus 1,000 mL (0 mL Intravenous Stopped 9/17/22 1520)   iopamidol (ISOVUE-300) 61 % injection 99 mL (99 mL Intravenous Given 9/17/22 1314)   ketorolac (TORADOL) injection 15 mg (15 mg Intravenous Given 9/17/22 1424)   cefTRIAXone (ROCEPHIN) 1 g/100 mL 0.9% NS (MBP) (0 g Intravenous Stopped 9/17/22 1520)       ED Course as of 09/17/22 1532   Sat Sep 17, 2022   1347 RBC, UA(!): Too Numerous to Count [MS]   1347 WBC, UA(!): 21-30 [MS]   1438 Patient's work-up is most remarkable for detection of 5 mm kidney stone in the left UVJ.  She has mild pyelonephritis.  Urinary tract infection is likewise noted.  She has had normal vital signs throughout her ED course without fever, tachycardia or hypotension.  Sepsis not suspected.  With the help of telemetry , we discussed plan of care and parameters for concern that would warrant return to the emergency department as well as importance of urology follow-up.  Patient and her  understand and concur with this outpatient plan she does have a history of kidney stones in the past. [MS]      ED Course User Index  [MS] Darling RuchiNISSA Alejandre             AS OF 15:32 EDT VITALS:    BP - 120/73  HR - 59  TEMP - 97.8 °F (36.6 °C) (Oral)  O2 SATS - 100%      KHLOE reviewed by Xavier Rodriguez MD, Ronald Andrea DO           DIAGNOSIS  Final diagnoses:   Ureteral colic   Kidney stone on left side          DISPOSITION    DISCHARGE    Patient discharged in stable condition.    Reviewed implications of results, diagnosis, meds, responsibility to follow up, warning signs and symptoms of possible worsening, potential complications and reasons to return to ER.    Patient/Family voiced understanding of above instructions.    Discussed plan for discharge, as there is no emergent indication for admission.  Pt/family is agreeable and understands need for follow up and possible repeat testing.  Pt/family is aware that discharge does not mean that nothing is wrong but that it indicates no emergency is currently present that requires admission and they must continue care with follow-up as given below or with a physician of their choice.     FOLLOW-UP  Amarjit Mann MD  [address] [facility] [address]  [place] [id number] [phone]               Medication List      New Prescriptions    cefdinir 300 MG capsule  Commonly known as: OMNICEF  Take 1 capsule by mouth 2 (Two) Times a Day.     HYDROcodone-acetaminophen 7.5-325 MG per tablet  Commonly known as: NORCO  Take 1 tablet by mouth Every 6 (Six) Hours As Needed for Moderate Pain.     ondansetron ODT 4 MG disintegrating tablet  Commonly known as: ZOFRAN-ODT  Place 1 tablet on the tongue Every 6 (Six) Hours As Needed for Nausea or Vomiting.           Where to Get Your Medications      [facility] [address] [place], [place] - [address] [facility] - [phone]  - [phone] FX  [address] [facility] [address] [address]    Phone: [phone]   · cefdinir 300 MG capsule  · HYDROcodone-acetaminophen 7.5-325 MG per tablet  · ondansetron ODT 4 MG disintegrating tablet              Ruchi Hastings, APRN  09/17/22 1538

## 2024-03-31 ENCOUNTER — READMISSION MANAGEMENT (OUTPATIENT)
Dept: CALL CENTER | Facility: HOSPITAL | Age: 34
End: 2024-03-31
Payer: COMMERCIAL

## 2024-03-31 NOTE — OUTREACH NOTE
Prep Survey      Flowsheet Row Responses   [facility] facility patient discharged from? Non-[facility]   Is LACE score < 7 ? Non-[facility] Discharge   Eligibility Not Eligible   What are the reasons patient is not eligible? Other  [last seen in 2020]   Does the patient have one of the following disease processes/diagnoses(primary or secondary)? Other   Prep survey completed? Yes            Josephine Lopez Registered Nurse